# Patient Record
Sex: FEMALE | Race: WHITE
[De-identification: names, ages, dates, MRNs, and addresses within clinical notes are randomized per-mention and may not be internally consistent; named-entity substitution may affect disease eponyms.]

---

## 2018-07-20 ENCOUNTER — HOSPITAL ENCOUNTER (OUTPATIENT)
Dept: HOSPITAL 61 - PCVCCLINIC | Age: 63
Discharge: HOME | End: 2018-07-20
Attending: INTERNAL MEDICINE
Payer: MEDICARE

## 2018-07-20 DIAGNOSIS — R06.02: ICD-10-CM

## 2018-07-20 DIAGNOSIS — E78.5: ICD-10-CM

## 2018-07-20 DIAGNOSIS — R07.89: Primary | ICD-10-CM

## 2018-07-20 PROCEDURE — 93005 ELECTROCARDIOGRAM TRACING: CPT

## 2018-07-20 PROCEDURE — 80061 LIPID PANEL: CPT

## 2018-08-22 ENCOUNTER — HOSPITAL ENCOUNTER (OUTPATIENT)
Dept: HOSPITAL 61 - PCVCIMAG | Age: 63
Discharge: HOME | End: 2018-08-22
Attending: INTERNAL MEDICINE
Payer: MEDICARE

## 2018-08-22 DIAGNOSIS — R06.09: Primary | ICD-10-CM

## 2018-08-22 DIAGNOSIS — R07.89: ICD-10-CM

## 2018-08-22 PROCEDURE — 93351 STRESS TTE COMPLETE: CPT

## 2018-08-22 PROCEDURE — 93325 DOPPLER ECHO COLOR FLOW MAPG: CPT

## 2018-08-22 NOTE — PCVCIMAG
--------------- APPROVED REPORT --------------





Study performed:  08/22/2018 08:39:11



Exam:  Stress Echocardiogram

Indication: chest pressure, Dyspnea, HLP

Patient Location: Echo lab

Stress Nurse: Terri Londono RN

Status: routine



Ht: 5 ft 6 in  

HR: 63 bpm      BP: 106/60 mmHg

Rhythm: NSR



Procedure

The patient underwent an Exercise Stress Test using the Jose 

Protocol. Blood pressure, heart rate, and EKG were monitored.

An Echocardiogram was performed by technician in four stages in quad 

fashion.  At peak stress, four selected images were obtained and 

placed side by side with resting images for comparison.



Stress Test Details

Stress Test:  Exercise stress testing was performed using a Jose 

protocol.

HR

Resting HR:            63 bpmMax Heart Rate (APMHR): 155 bpm 

Max HR Achieved:  134 bpmTarget HR (85% APMHR): 131 bpm

% of APMHR:         86

Recovery HR:            72 bpm

HR response to stress: Normal HR response to stress



BP

Resting BP:  106/60 mmHg

Max BP:       124/60 mmHg

Recovery BP:       104/70 mmHg



ECG

Resting ECG:  Sinus Rhythm w/ incomplete RBBB

Stress ECG:     Sinus Rhythm

ST Change: non specific ST abnormality at rest

Maximum ST Deviation: 0 mm

Arrhythmia:    occasional PACs

Recovery ECG: Sinus Rhythm

Recovery ST Change: Normal

Recovery ST Deviation: 0 mm

Recovery Arrhythmia: occasional PACs



Clinical

Reason for Termination: Maximal effort, Dyspnea

Stress Symptoms: Dyspnea

Exercise duration: 9 min sec

Highest Stage Achieved: Stage 3: 3.4 mph at 14% grade. 

Exercise capacity: 10.1 METs

Overall Exercise Capacity for Age: Normal

Scale: Active

Angina Score: None



Stress ECG Conclusion

Clinical: Non-ischemic

ECG: Non-ischemic

Duke Treadmill Score is 9.0 which is Low risk.



Pre-Stress Echo

The resting Echocardiogram showed normal left ventricular 

contractility with an estimated Ejection Fraction of about &gt;55%. 

Normal wall motion in all segments on baseline images.



Post-Stress Echo

The stress Echocardiogram showed normal left ventricular 

contractility with an estimated Ejection Fraction of about 65%. 

Normal augmentation of wall motion in all segments on post stress 

images.



Clinical

No clinical or ECG evidence for ischemia.



Conclusion

Clinical Response:  Non-ischemic

Exercise Capacity:  Average

Stress ECG Response:  Non-ischemic

Stress Echo Images:  Non-ischemic

The left ventricle is normal in size and wall thickness in both the 

rest and stress images.

Normal stress echocardiogram with maximal exercise stress. 



Other Information

Study Quality: Adequate



&lt;Conclusion&gt;

The left ventricle is normal in size and wall thickness in both the 

rest and stress images.

Normal stress echocardiogram with maximal exercise stress.